# Patient Record
Sex: MALE | Race: WHITE | NOT HISPANIC OR LATINO | URBAN - METROPOLITAN AREA
[De-identification: names, ages, dates, MRNs, and addresses within clinical notes are randomized per-mention and may not be internally consistent; named-entity substitution may affect disease eponyms.]

---

## 2017-11-02 ENCOUNTER — OFFICE VISIT (OUTPATIENT)
Dept: URGENT CARE | Facility: PHYSICIAN GROUP | Age: 22
End: 2017-11-02
Payer: COMMERCIAL

## 2017-11-02 VITALS
BODY MASS INDEX: 33.37 KG/M2 | HEART RATE: 88 BPM | TEMPERATURE: 97.6 F | OXYGEN SATURATION: 96 % | HEIGHT: 74 IN | SYSTOLIC BLOOD PRESSURE: 120 MMHG | WEIGHT: 260 LBS | RESPIRATION RATE: 16 BRPM | DIASTOLIC BLOOD PRESSURE: 78 MMHG

## 2017-11-02 DIAGNOSIS — S93.401A SPRAIN OF RIGHT ANKLE, UNSPECIFIED LIGAMENT, INITIAL ENCOUNTER: ICD-10-CM

## 2017-11-02 DIAGNOSIS — S89.91XA INJURY OF RIGHT KNEE, INITIAL ENCOUNTER: ICD-10-CM

## 2017-11-02 PROCEDURE — 99213 OFFICE O/P EST LOW 20 MIN: CPT | Performed by: PHYSICIAN ASSISTANT

## 2017-11-02 ASSESSMENT — ENCOUNTER SYMPTOMS
HEADACHES: 0
DIARRHEA: 0
FEVER: 0
NAUSEA: 0
CHILLS: 0
DIZZINESS: 0
COUGH: 0
VOMITING: 0
ABDOMINAL PAIN: 0
SHORTNESS OF BREATH: 0

## 2017-11-02 ASSESSMENT — PAIN SCALES - GENERAL: PAINLEVEL: 8=MODERATE-SEVERE PAIN

## 2017-11-02 NOTE — LETTER
November 2, 2017         Patient: Hao Sesay   YOB: 1995   Date of Visit: 11/2/2017           To Whom it May Concern:    Hao Sesay was seen in my clinic on 11/2/2017. Please excuse his absence from 11/2/17-11/4/17.    If you have any questions or concerns, please don't hesitate to call.        Sincerely,           Elena Mcclelland P.A.-C.  Electronically Signed

## 2017-11-03 NOTE — PROGRESS NOTES
"Subjective:      Hao Sesay is a 22 y.o. male who presents with Fall (injured right knee and ankle, x 24 hours)            HPI   Patient presents to urgent care reporting right knee pain and right ankle pain starting yesterday while hiking. He states he rolled his ankle inwards, and the knee also twisted with the knee bending medially and the lower leg outwards. He reports the knee is still sore today and he has pain with weight bearing. He is not concerned about the ankle. He denies any swelling or bruising around the knee. No previous knee injuries or surgeries. No distal numbness/tingling.     Review of Systems   Constitutional: Negative for chills and fever.   Respiratory: Negative for cough and shortness of breath.    Cardiovascular: Negative for chest pain.   Gastrointestinal: Negative for abdominal pain, diarrhea, nausea and vomiting.   Genitourinary: Negative.    Musculoskeletal:        Positive for knee pain  Positive for ankle pain   Neurological: Negative for dizziness and headaches.        Objective:     /78   Pulse 88   Temp 36.4 °C (97.6 °F)   Resp 16   Ht 1.88 m (6' 2\")   Wt 117.9 kg (260 lb)   SpO2 96%   BMI 33.38 kg/m²      Physical Exam   Constitutional: He is oriented to person, place, and time. He appears well-developed and well-nourished. No distress.   HENT:   Head: Normocephalic and atraumatic.   Eyes: Pupils are equal, round, and reactive to light.   Neck: Normal range of motion.   Cardiovascular: Normal rate.    Pulmonary/Chest: Effort normal.   Musculoskeletal: Normal range of motion.        Right knee: He exhibits LCL laxity and MCL laxity. He exhibits normal range of motion, no swelling, no effusion and no ecchymosis. Tenderness found. Medial joint line tenderness noted.        Right ankle: He exhibits normal range of motion, no swelling, no ecchymosis and normal pulse. No tenderness. Achilles tendon exhibits no pain.   Neurological: He is alert and oriented to " person, place, and time.   Skin: Skin is warm and dry. He is not diaphoretic.   Psychiatric: He has a normal mood and affect. His behavior is normal.   Nursing note and vitals reviewed.           PMH:  has no past medical history of Allergy or ASTHMA.  MEDS:   Current Outpatient Prescriptions:   •  hydrocodone-acetaminophen (NORCO) 5-325 MG TABS per tablet, Take 1-2 Tabs by mouth every four hours as needed., Disp: 30 Tab, Rfl: 0  ALLERGIES:   Allergies   Allergen Reactions   • Nkda [No Known Drug Allergy]      SURGHX:   Past Surgical History:   Procedure Laterality Date   • APPENDECTOMY LAPAROSCOPIC  4/25/2014    Performed by Sharona Barba M.D. at SURGERY [facility]     SOCHX:  reports that he has never smoked. He has never used smokeless tobacco. He reports that he drinks alcohol. He reports that he uses drugs.  FH: family history is not on file.    Assessment/Plan:     1. Injury of right knee, initial encounter    Suspect sprain of MCL or medial meniscus. Patient has full ROM without swelling or bruising noted. Able to ambulate without difficulty. Encouraged to wear compressive knee sleeve and ice the knee 2-3 times daily with elevation of the limb. OTC nsaids as needed for pain, take with food. Work note given at today's visit. Follow up with PCP or ortho if symptoms persist after 1-2 weeks. The patient demonstrated a good understanding and agreed with the treatment plan.    2. Sprain of right ankle, unspecified ligament, initial encounter

## 2018-03-05 ENCOUNTER — OFFICE VISIT (OUTPATIENT)
Dept: URGENT CARE | Facility: PHYSICIAN GROUP | Age: 23
End: 2018-03-05
Payer: COMMERCIAL

## 2018-03-05 VITALS
DIASTOLIC BLOOD PRESSURE: 74 MMHG | HEIGHT: 74 IN | WEIGHT: 264 LBS | HEART RATE: 103 BPM | RESPIRATION RATE: 16 BRPM | TEMPERATURE: 98.4 F | SYSTOLIC BLOOD PRESSURE: 120 MMHG | OXYGEN SATURATION: 100 % | BODY MASS INDEX: 33.88 KG/M2

## 2018-03-05 DIAGNOSIS — J06.9 UPPER RESPIRATORY TRACT INFECTION, UNSPECIFIED TYPE: ICD-10-CM

## 2018-03-05 DIAGNOSIS — R05.9 COUGH: ICD-10-CM

## 2018-03-05 PROCEDURE — 99214 OFFICE O/P EST MOD 30 MIN: CPT | Performed by: PHYSICIAN ASSISTANT

## 2018-03-05 RX ORDER — PROMETHAZINE HYDROCHLORIDE AND CODEINE PHOSPHATE 6.25; 1 MG/5ML; MG/5ML
5 SYRUP ORAL EVERY 12 HOURS PRN
Qty: 80 ML | Refills: 0 | Status: SHIPPED | OUTPATIENT
Start: 2018-03-05 | End: 2018-03-19

## 2018-03-05 RX ORDER — BENZONATATE 100 MG/1
100 CAPSULE ORAL 3 TIMES DAILY PRN
Qty: 60 CAP | Refills: 0 | Status: SHIPPED | OUTPATIENT
Start: 2018-03-05

## 2018-03-05 ASSESSMENT — ENCOUNTER SYMPTOMS
EYE DISCHARGE: 0
CHILLS: 1
VOMITING: 1
COUGH: 1
WHEEZING: 0
SORE THROAT: 0
DIARRHEA: 0
NAUSEA: 1
FEVER: 0
MYALGIAS: 0
CONSTIPATION: 0
EYE REDNESS: 0
ABDOMINAL PAIN: 0
SHORTNESS OF BREATH: 0
DIZZINESS: 0
BLOOD IN STOOL: 0
HEADACHES: 1
SPUTUM PRODUCTION: 1

## 2018-03-05 NOTE — PROGRESS NOTES
"Subjective:   Hao Sesay is a 22 y.o. male who presents for Sinus Problem (x2days, nasal congestion , cough)        Sinus Problem   This is a new problem. The current episode started yesterday. Associated symptoms include chills, congestion, coughing and headaches. Pertinent negatives include no ear pain, shortness of breath or sore throat.   notes last 3d of cough, runny stuffy nose, cough/runny nose keeps awake, fever/chills last night, denies flu shot, did have emesis last night, denies being sick for years, denies abd pain/diarrhea/rash, denies PMH of asthma, denies PMH of bronchitis/pneumonia, took some dayquil. Alkaseltzer.    Review of Systems   Constitutional: Positive for chills. Negative for fever.   HENT: Positive for congestion. Negative for ear pain and sore throat.    Eyes: Negative for discharge and redness.   Respiratory: Positive for cough and sputum production. Negative for shortness of breath and wheezing.    Cardiovascular: Negative for chest pain and leg swelling.   Gastrointestinal: Positive for nausea and vomiting ( resolved). Negative for abdominal pain, blood in stool, constipation, diarrhea and melena.   Musculoskeletal: Negative for myalgias.   Skin: Negative for rash.   Neurological: Positive for headaches. Negative for dizziness.   Endo/Heme/Allergies: Negative for environmental allergies.     Allergies   Allergen Reactions   • Nkda [No Known Drug Allergy]    I have worn a mask for the entire encounter with this patient.      Objective:   /74   Pulse (!) 103   Temp 36.9 °C (98.4 °F)   Resp 16   Ht 1.88 m (6' 2\")   Wt 119.7 kg (264 lb)   SpO2 100%   BMI 33.90 kg/m²   Physical Exam   Constitutional: He is oriented to person, place, and time. He appears well-developed and well-nourished. No distress.   HENT:   Head: Normocephalic and atraumatic.   Right Ear: Tympanic membrane, external ear and ear canal normal.   Left Ear: Tympanic membrane, external ear and ear canal " normal.   Nose: Nose normal.   Mouth/Throat: Uvula is midline and mucous membranes are normal. Posterior oropharyngeal erythema ( mild PND) present. No oropharyngeal exudate, posterior oropharyngeal edema or tonsillar abscesses.   Eyes: Conjunctivae are normal. Right eye exhibits no discharge. Left eye exhibits no discharge. No scleral icterus.   Neck: Neck supple.   Pulmonary/Chest: Effort normal. No respiratory distress. He has no decreased breath sounds. He has no wheezes. He has no rhonchi. He has no rales.   Musculoskeletal: Normal range of motion.   Lymphadenopathy:     He has cervical adenopathy ( mild bilat).   Neurological: He is alert and oriented to person, place, and time. Coordination normal.   Skin: Skin is warm and dry. He is not diaphoretic. No pallor.   Psychiatric: He has a normal mood and affect.   Nursing note and vitals reviewed.        Assessment/Plan:   Assessment    1. Cough  Supportive care is reviewed with patient/caregiver - recommend to push PO fluids and electrolytes, Nsaids/tylenol, netti pot/saline irrig, humidifier in home, flonase, ponaris, OTC decongestant meds, tessalon, prometh w/ codeine, Cautioned regarding potential for sedation with medication.  Return to clinic with lack of resolution or progression of symptoms.    - promethazine-codeine (PHENERGAN-CODEINE) 6.25-10 MG/5ML Syrup; Take 5 mL by mouth every 12 hours as needed for up to 14 days.  Dispense: 80 mL; Refill: 0  - benzonatate (TESSALON) 100 MG Cap; Take 1 Cap by mouth 3 times a day as needed for Cough.  Dispense: 60 Cap; Refill: 0    2. Upper respiratory tract infection, unspecified type    - benzonatate (TESSALON) 100 MG Cap; Take 1 Cap by mouth 3 times a day as needed for Cough.  Dispense: 60 Cap; Refill: 0    Differential diagnosis, natural history, supportive care, and indications for immediate follow-up discussed.

## 2018-03-05 NOTE — LETTER
March 5, 2018       Patient: Hao Sesay   YOB: 1995   Date of Visit: 3/5/2018         To Whom It May Concern:    It is my medical opinion that Hao Sesay should be excused from work for yesterday and today due to illness.      If you have any questions or concerns, please don't hesitate to call [phone]          Sincerely,          Demar Sullivan P.A.-C.  Electronically Signed

## 2022-10-17 ENCOUNTER — NON-PROVIDER VISIT (OUTPATIENT)
Dept: URGENT CARE | Facility: PHYSICIAN GROUP | Age: 27
End: 2022-10-17

## 2022-10-17 DIAGNOSIS — Z02.1 PRE-EMPLOYMENT DRUG SCREENING: ICD-10-CM

## 2022-10-17 LAB
AMP AMPHETAMINE: NORMAL
COC COCAINE: NORMAL
INT CON NEG: NEGATIVE
INT CON POS: POSITIVE
MET METHAMPHETAMINES: NORMAL
OPI OPIATES: NORMAL
PCP PHENCYCLIDINE: NORMAL
POC DRUG COMMENT 753798-OCCUPATIONAL HEALTH: NEGATIVE
THC: NORMAL

## 2022-10-17 PROCEDURE — 80305 DRUG TEST PRSMV DIR OPT OBS: CPT | Performed by: PHYSICIAN ASSISTANT
